# Patient Record
Sex: MALE | Race: WHITE
[De-identification: names, ages, dates, MRNs, and addresses within clinical notes are randomized per-mention and may not be internally consistent; named-entity substitution may affect disease eponyms.]

---

## 2017-02-28 NOTE — CODING QUERY MEDICAL NECESSITY
SUPPORTING DIAGNOSIS NEEDED





A supporting diagnosis is required for the test/procedure performed on this patient in 
order for us to be reimbursed by the patient's insurance. Please provide a supporting 
diagnosis for the following test/procedure listed below next to the test name along with 
your signature. 



*If there is no additional diagnosis for this patient that would support the following 
test/procedure please document that below next to the test/procedure.



Test(s)/Procedure(s) that require a supporting diagnosis:

DOS 2/21

* Hba1c      DIAGNOSIS:



Provider Signature:  ______________________________  Date:  _______



Thank you  

Madiha Qureshi

Health Information Management

Phone:  [phone]

Fax:  [phone]



Once completed, please kindly fax back to [phone]



For questions please call [phone]

## 2017-10-11 NOTE — DIAGNOSTIC IMAGING REPORT
STERNOCLAVICULAR JOINT(S)



CLINICAL HISTORY: M19.019 Osteoarthritis of sternoclavicular joint.



COMPARISON STUDY:  Chest CT 4/18/2008.



FINDINGS: Moderate right sternoclavicular joint osteoarthritis demonstrated by

subchondral sclerosis, cartilage space narrowing, and marginal osteophytes. The

left sternoclavicular joint is within normal limits. No subluxation. No

fractures identified within the visualized clavicles. Poststernotomy changes.



IMPRESSION:  Moderate right sternoclavicular joint osteoarthritis.









Electronically signed by:  Merrick Hester M.D.

10/11/2017 9:41 AM



Dictated Date/Time:  10/11/2017 9:37 AM

## 2017-11-29 ENCOUNTER — HOSPITAL ENCOUNTER (OUTPATIENT)
Dept: HOSPITAL 45 - C.LABBC | Age: 77
Discharge: HOME | End: 2017-11-29
Attending: INTERNAL MEDICINE
Payer: COMMERCIAL

## 2017-11-29 DIAGNOSIS — E11.9: ICD-10-CM

## 2017-11-29 DIAGNOSIS — G31.84: ICD-10-CM

## 2017-11-29 DIAGNOSIS — E55.9: ICD-10-CM

## 2017-11-29 DIAGNOSIS — I10: Primary | ICD-10-CM

## 2017-11-29 DIAGNOSIS — I48.2: ICD-10-CM

## 2017-11-29 DIAGNOSIS — Z79.01: ICD-10-CM

## 2017-11-29 DIAGNOSIS — D64.9: ICD-10-CM

## 2017-11-29 LAB
ALBUMIN/GLOB SERPL: 1.1 {RATIO} (ref 0.9–2)
ALP SERPL-CCNC: 112 U/L (ref 45–117)
ALT SERPL-CCNC: 34 U/L (ref 12–78)
ANION GAP SERPL CALC-SCNC: 5 MMOL/L (ref 3–11)
AST SERPL-CCNC: 21 U/L (ref 15–37)
BASOPHILS # BLD: 0.03 K/UL (ref 0–0.2)
BASOPHILS NFR BLD: 0.3 %
BUN SERPL-MCNC: 18 MG/DL (ref 7–18)
BUN/CREAT SERPL: 15.3 (ref 10–20)
CALCIUM SERPL-MCNC: 9.9 MG/DL (ref 8.5–10.1)
CHLORIDE SERPL-SCNC: 105 MMOL/L (ref 98–107)
CHOLEST/HDLC SERPL: 2.3 {RATIO}
CO2 SERPL-SCNC: 26 MMOL/L (ref 21–32)
COMPLETE: YES
CREAT SERPL-MCNC: 1.19 MG/DL (ref 0.6–1.4)
EOSINOPHIL NFR BLD AUTO: 193 K/UL (ref 130–400)
EST. AVERAGE GLUCOSE BLD GHB EST-MCNC: 163 MG/DL
GLOBULIN SER-MCNC: 3.8 GM/DL (ref 2.5–4)
GLUCOSE SERPL-MCNC: 142 MG/DL (ref 70–99)
GLUCOSE UR QL: 38 MG/DL
HCT VFR BLD CALC: 41 % (ref 42–52)
IG%: 0.2 %
IMM GRANULOCYTES NFR BLD AUTO: 13.7 %
KETONES UR QL STRIP: 19 MG/DL
LYMPHOCYTES # BLD: 1.18 K/UL (ref 1.2–3.4)
MCH RBC QN AUTO: 29.9 PG (ref 25–34)
MCHC RBC AUTO-ENTMCNC: 32.9 G/DL (ref 32–36)
MCV RBC AUTO: 90.9 FL (ref 80–100)
MONOCYTES NFR BLD: 6.7 %
NEUTROPHILS # BLD AUTO: 3.2 %
NEUTROPHILS NFR BLD AUTO: 75.9 %
NITRITE UR QL STRIP: 144 MG/DL (ref 0–150)
PH UR: 86 MG/DL (ref 0–200)
PMV BLD AUTO: 9.4 FL (ref 7.4–10.4)
POTASSIUM SERPL-SCNC: 4.4 MMOL/L (ref 3.5–5.1)
RBC # BLD AUTO: 4.51 M/UL (ref 4.7–6.1)
SODIUM SERPL-SCNC: 136 MMOL/L (ref 136–145)
TSH SERPL-ACNC: 1.01 UIU/ML (ref 0.3–4.5)
VERY LOW DENSITY LIPOPROT CALC: 29 MG/DL
WBC # BLD AUTO: 8.63 K/UL (ref 4.8–10.8)

## 2018-01-19 ENCOUNTER — HOSPITAL ENCOUNTER (OUTPATIENT)
Dept: HOSPITAL 45 - C.LABBFT | Age: 78
Discharge: HOME | End: 2018-01-19
Attending: INTERNAL MEDICINE
Payer: COMMERCIAL

## 2018-01-19 DIAGNOSIS — I48.2: Primary | ICD-10-CM

## 2018-01-19 LAB — INR PPP: 2.7 (ref 0.9–1.1)

## 2018-01-31 ENCOUNTER — HOSPITAL ENCOUNTER (OUTPATIENT)
Dept: HOSPITAL 45 - X.SURG | Age: 78
Discharge: HOME | End: 2018-01-31
Attending: SPECIALIST
Payer: COMMERCIAL

## 2018-01-31 VITALS
BODY MASS INDEX: 24.38 KG/M2 | BODY MASS INDEX: 24.38 KG/M2 | WEIGHT: 155.32 LBS | HEIGHT: 67.01 IN | WEIGHT: 155.32 LBS | HEIGHT: 67.01 IN

## 2018-01-31 VITALS — SYSTOLIC BLOOD PRESSURE: 134 MMHG | OXYGEN SATURATION: 97 % | HEART RATE: 98 BPM | DIASTOLIC BLOOD PRESSURE: 87 MMHG

## 2018-01-31 VITALS — TEMPERATURE: 97.88 F

## 2018-01-31 DIAGNOSIS — Z98.41: ICD-10-CM

## 2018-01-31 DIAGNOSIS — E78.5: ICD-10-CM

## 2018-01-31 DIAGNOSIS — Z88.0: ICD-10-CM

## 2018-01-31 DIAGNOSIS — Z87.891: ICD-10-CM

## 2018-01-31 DIAGNOSIS — Z79.899: ICD-10-CM

## 2018-01-31 DIAGNOSIS — Z79.01: ICD-10-CM

## 2018-01-31 DIAGNOSIS — I48.91: ICD-10-CM

## 2018-01-31 DIAGNOSIS — E11.36: ICD-10-CM

## 2018-01-31 DIAGNOSIS — H25.12: Primary | ICD-10-CM

## 2018-01-31 DIAGNOSIS — Z95.1: ICD-10-CM

## 2018-01-31 RX ADMIN — TROPICAMIDE SCH DROP: 10 SOLUTION/ DROPS OPHTHALMIC at 08:39

## 2018-01-31 RX ADMIN — PHENYLEPHRINE HYDROCHLORIDE SCH DROP: 25 SOLUTION/ DROPS OPHTHALMIC at 08:43

## 2018-01-31 RX ADMIN — MOXIFLOXACIN HYDROCHLORIDE SCH DROP: 5 SOLUTION/ DROPS OPHTHALMIC at 08:41

## 2018-01-31 RX ADMIN — CYCLOPENTOLATE HYDROCHLORIDE SCH DROP: 10 SOLUTION/ DROPS OPHTHALMIC at 08:45

## 2018-01-31 RX ADMIN — TROPICAMIDE SCH DROP: 10 SOLUTION/ DROPS OPHTHALMIC at 08:44

## 2018-01-31 RX ADMIN — CYCLOPENTOLATE HYDROCHLORIDE SCH DROP: 10 SOLUTION/ DROPS OPHTHALMIC at 08:40

## 2018-01-31 RX ADMIN — PHENYLEPHRINE HYDROCHLORIDE SCH DROP: 25 SOLUTION/ DROPS OPHTHALMIC at 08:39

## 2018-01-31 RX ADMIN — MOXIFLOXACIN HYDROCHLORIDE SCH DROP: 5 SOLUTION/ DROPS OPHTHALMIC at 08:54

## 2018-01-31 NOTE — ANESTHESIA PROGRESS NT - [facility]
Anesthesia Post Op Note


Date & Time


Jan 31, 2018 at 09:33





Vital Signs


Pain Intensity:  0





Vital Signs Past 12 Hours








  Date Time  Temp Pulse Resp B/P (MAP) Pulse Ox O2 Delivery O2 Flow Rate FiO2


 


1/31/18 09:22 36.6 103 18 111/67 (82) 94 Room Air  


 


1/31/18 08:29 36.5 84 16 122/68 (86) 97 Room Air  











Notes


Mental Status:  alert / awake / arousable, participated in evaluation


Pt Amnestic to Procedure:  Yes


Nausea / Vomiting:  adequately controlled


Pain:  adequately controlled


Airway Patency, RR, SpO2:  stable & adequate


BP & HR:  stable & adequate


Hydration State:  stable & adequate


Anesthetic Complications:  no major complications apparent

## 2018-01-31 NOTE — DISCHARGE INSTRUCTIONS-[facility]
Discharge Instructions


Date of Service


Jan 31, 2018.





Visit


Reason for Visit:  Cataract Left Eye





Discharge


Discharge Diagnosis / Problem:  lens implant left implant





Discharge Goals


Goal(s):  Improve function





Activity Recommendations


Activity Limitations:  resume your previous activity


Lifting Limitations:  no more than 10 pounds


Exercise/Sports Limitations:  gradually increase as tolerated


May Resume Sexual Activity:  when tolerated


Shower/Bathe:  tomorrow


Driving or Machine Use:  resume 1 day after discharge





Anesthesia


.





Post Anesthesia Instructions:





If you have had General Anesthesia or IV Sedation:





*  Do not drive today.


*  Resume driving when surgeon permits.


*  Do not make important decisions or sign legal documents today.


*  Call surgeon for:





   1.  Temperature elevations greater than 101 degrees F.


   2.  Uncontrollable pain.


   3.  Excessive bleeding.


   4.  Persistent nausea and vomiting.


   5.  Medication intolerance (nausea, vomiting or rash).





*  For nausea and vomiting use only clear liquids such as: tea, soda, bouillon 

until nausea subsides, then gradually increase diet as tolerated.





*  If you have any concerns or questions, call your surgeon's office.  If 

physician is unavailable and it is an emergency, call 911 or go to the nearest 

emergency room.





.





Instructions / Follow-Up


Instructions / Follow-Up





ACTIVITY RECOMMENDATIONS:





*  Light activities.





*  Mild irritation and blurred vision are common for the first few days.





*  You may walk outside, read, watch television.





*  Redness around the white part of the eye is common.








MEDICATIONS:





Resume previous medications unless instructed otherwise by your surgeon.





*  Take white Diamox (Acetazolamide) tablet at 1 pm today.





Start all eye drops at 1 pm today:





*  Eye drops (today and tomorrow):


   Prednisone - one drop in operative eye every 3 hours while awake


            Ofloxacin - one drop in operative eye every  3 hours while awake


   





SPECIAL CARE INSTRUCTIONS:





*  Tape plastic shield over eye to sleep at night.  





Call your doctor at [phone] with any concerns or problems.








FOLLOW UP VISIT:





Follow-up with Dr West at [facility] as scheduled.





Diet Recommendations


Home Diet:  no limitations





Procedures


Procedures Performed:  


Left Cataract Phacoemulsification With Intraocular Lens Implant





Pending Studies


Studies pending at discharge:  no





Medical Emergencies








.


Who to Call and When:





Medical Emergencies:  If at any time you feel your situation is an emergency, 

please call 911 immediately.





.





Non-Emergent Contact


Non-Emergency issues call your:  Ophthalmologist


Call Non-Emergent contact if:  your pain is not controlled


[phone]


.


.








"Provider Documentation" section prepared by Lamont West.








.

## 2018-01-31 NOTE — [facility] OPERATIVE REPORT
Operative Report


Date of Service


Jan 31, 2018.





Operative Report


1.  PREOPERATIVE DIAGNOSIS:  Senile nuclear cataract, left eye.  





2.  POSTOPERATIVE DIAGNOSIS:  Senile nuclear cataract, left eye.  





3.  PROCEDURE:  Phacoemulsification of left cataract with posterior chamber 

lens implant, type [employer], model MI60L, power +20.5 diopters. 





ANESTHESIA:   Local standby.  SURGEON:  Dr. West. COMPLICATIONS:  None.  

OPERATING TIME:  10 minutes.  





4.  OPERATION AND FINDINGS: 





DESCRIPTION OF PROCEDURE:  The left pupil was dilated.  The anesthetic was 

administered using a topical technique.  The left eye was prepped and draped.  

A speculum was placed.  A clear corneal incision was formed.  The chamber was 

filled with Amvisc Plus and Endocoat.  Epinephrine solution was used.  A 

paracentesis was placed.  A capsulorrhexis was performed.  The nucleus was 

hydrodissected.  The lens was removed with phacoemulsification.  Time was 6.34 

seconds.  The aspiration unit was used to remove the cortex.  The capsule was 

filled with Amvisc Plus.  The lens implant was folded and placed into the 

capsule.  The incision was hydrated.  The Amvisc was aspirated.  The wound was 

secure.  The chamber was deep.  The pupil was round.  Brimonidine, TobraDex 

ointment and Vigamox solution were placed.  The speculum was removed.  The 

patient was returned to the Recovery Room in stable condition.


I attest to the content of the Intraoperative Record and any orders documented 

therein.  Any exceptions are noted below.


The scribe's documentation has been prepared in my presence, under my direction 

and personally reviewed by me in its entirety. I confirm that the note above 

accurately reflects all work, treatment, procedures, and medical decision 

making performed by me.








I personally scribed for Lamont West M.D. ([email or web address]) on 1/31/18 at 09:17.  

Electronically submitted by Merced Estrada ([email or web address]).

## 2018-08-09 ENCOUNTER — HOSPITAL ENCOUNTER (OUTPATIENT)
Dept: HOSPITAL 45 - C.LABBFT | Age: 78
Discharge: HOME | End: 2018-08-09
Attending: INTERNAL MEDICINE
Payer: COMMERCIAL

## 2018-08-09 DIAGNOSIS — E78.5: Primary | ICD-10-CM

## 2018-08-09 LAB
ALT SERPL-CCNC: 24 U/L (ref 12–78)
AST SERPL-CCNC: 21 U/L (ref 15–37)
KETONES UR QL STRIP: 90 MG/DL
PH UR: 162 MG/DL (ref 0–200)